# Patient Record
Sex: FEMALE | Race: OTHER | HISPANIC OR LATINO | Employment: FULL TIME | URBAN - METROPOLITAN AREA
[De-identification: names, ages, dates, MRNs, and addresses within clinical notes are randomized per-mention and may not be internally consistent; named-entity substitution may affect disease eponyms.]

---

## 2020-06-24 PROBLEM — O99.810 ABNORMAL O'SULLIVAN GLUCOSE CHALLENGE TEST, ANTEPARTUM: Status: ACTIVE | Noted: 2020-06-24

## 2020-06-24 PROBLEM — Z98.51 TUBAL LIGATION STATUS: Status: ACTIVE | Noted: 2020-06-24

## 2020-06-24 PROBLEM — E66.09 CLASS 1 OBESITY DUE TO EXCESS CALORIES WITHOUT SERIOUS COMORBIDITY IN ADULT: Status: ACTIVE | Noted: 2020-06-24

## 2020-06-24 PROBLEM — Z78.9 USES SPANISH AS PRIMARY SPOKEN LANGUAGE: Status: ACTIVE | Noted: 2020-06-24

## 2020-06-24 PROBLEM — Z98.891 HISTORY OF CESAREAN DELIVERY: Status: ACTIVE | Noted: 2020-06-24

## 2020-08-17 PROBLEM — A74.9 CHLAMYDIA INFECTION DURING PREGNANCY: Status: ACTIVE | Noted: 2020-08-17

## 2020-08-17 PROBLEM — O98.819 CHLAMYDIA INFECTION DURING PREGNANCY: Status: ACTIVE | Noted: 2020-08-17

## 2020-08-17 PROBLEM — Z3A.19 19 WEEKS GESTATION OF PREGNANCY: Status: ACTIVE | Noted: 2020-08-17

## 2020-08-17 PROBLEM — O99.212 OBESITY AFFECTING PREGNANCY IN SECOND TRIMESTER: Status: ACTIVE | Noted: 2020-06-24

## 2020-09-30 PROBLEM — O09.92 SUPERVISION OF HIGH-RISK PREGNANCY, SECOND TRIMESTER: Status: ACTIVE | Noted: 2020-09-30

## 2020-09-30 PROBLEM — G44.221 CHRONIC TENSION-TYPE HEADACHE, INTRACTABLE: Status: ACTIVE | Noted: 2020-09-30

## 2020-10-19 PROBLEM — Z3A.28 28 WEEKS GESTATION OF PREGNANCY: Status: ACTIVE | Noted: 2020-08-17

## 2020-10-19 PROBLEM — O09.93 SUPERVISION OF HIGH-RISK PREGNANCY, THIRD TRIMESTER: Status: ACTIVE | Noted: 2020-09-30

## 2020-10-19 PROBLEM — O26.843 UTERINE SIZE-DATE DISCREPANCY IN THIRD TRIMESTER: Status: ACTIVE | Noted: 2020-10-19

## 2020-11-08 PROBLEM — Z3A.30 30 WEEKS GESTATION OF PREGNANCY: Status: ACTIVE | Noted: 2020-08-17

## 2020-11-23 PROBLEM — Z3A.33 33 WEEKS GESTATION OF PREGNANCY: Status: ACTIVE | Noted: 2020-08-17

## 2020-12-07 PROBLEM — Z98.51 TUBAL LIGATION STATUS: Status: RESOLVED | Noted: 2020-06-24 | Resolved: 2020-12-07

## 2020-12-07 PROBLEM — Z3A.35 35 WEEKS GESTATION OF PREGNANCY: Status: ACTIVE | Noted: 2020-08-17

## 2020-12-30 PROBLEM — O98.513 COVID-19 AFFECTING PREGNANCY IN THIRD TRIMESTER: Status: ACTIVE | Noted: 2020-12-30

## 2020-12-30 PROBLEM — U07.1 COVID-19 AFFECTING PREGNANCY IN THIRD TRIMESTER: Status: ACTIVE | Noted: 2020-12-30

## 2021-01-06 PROBLEM — O28.8 EQUIVOCAL NON-STRESS TEST: Status: ACTIVE | Noted: 2021-01-06

## 2021-01-18 PROBLEM — O26.843 UTERINE SIZE-DATE DISCREPANCY IN THIRD TRIMESTER: Status: RESOLVED | Noted: 2020-10-19 | Resolved: 2021-01-18

## 2021-03-08 PROBLEM — O99.810 ABNORMAL O'SULLIVAN GLUCOSE CHALLENGE TEST, ANTEPARTUM: Status: RESOLVED | Noted: 2020-06-24 | Resolved: 2021-03-08

## 2021-03-08 PROBLEM — Z3A.35 35 WEEKS GESTATION OF PREGNANCY: Status: RESOLVED | Noted: 2020-08-17 | Resolved: 2021-03-08

## 2021-03-08 PROBLEM — O28.8 EQUIVOCAL NON-STRESS TEST: Status: RESOLVED | Noted: 2021-01-06 | Resolved: 2021-03-08

## 2021-03-08 PROBLEM — O09.93 SUPERVISION OF HIGH-RISK PREGNANCY, THIRD TRIMESTER: Status: RESOLVED | Noted: 2020-09-30 | Resolved: 2021-03-08

## 2021-03-08 PROBLEM — A74.9 CHLAMYDIA INFECTION DURING PREGNANCY: Status: RESOLVED | Noted: 2020-08-17 | Resolved: 2021-03-08

## 2021-03-08 PROBLEM — O99.212 OBESITY AFFECTING PREGNANCY IN SECOND TRIMESTER: Status: RESOLVED | Noted: 2020-06-24 | Resolved: 2021-03-08

## 2021-03-08 PROBLEM — O98.819 CHLAMYDIA INFECTION DURING PREGNANCY: Status: RESOLVED | Noted: 2020-08-17 | Resolved: 2021-03-08

## 2021-03-22 PROBLEM — J02.9 SORE THROAT: Status: ACTIVE | Noted: 2021-03-22

## 2021-03-22 PROBLEM — K59.00 CONSTIPATION: Status: ACTIVE | Noted: 2021-03-22

## 2021-03-22 PROBLEM — K92.1 BLOOD IN THE STOOL: Status: ACTIVE | Noted: 2021-03-22

## 2021-09-30 PROBLEM — H35.413 LATTICE DEGENERATION OF BOTH RETINAS: Status: ACTIVE | Noted: 2021-09-30

## 2021-09-30 PROBLEM — H53.001 AMBLYOPIA, RIGHT: Status: ACTIVE | Noted: 2021-09-30

## 2021-09-30 PROBLEM — H52.203 MYOPIA OF BOTH EYES WITH ASTIGMATISM: Status: ACTIVE | Noted: 2021-09-30

## 2021-09-30 PROBLEM — H52.13 MYOPIA OF BOTH EYES WITH ASTIGMATISM: Status: ACTIVE | Noted: 2021-09-30

## 2024-05-14 PROBLEM — Z30.017 NEXPLANON INSERTION: Status: ACTIVE | Noted: 2024-05-14

## 2024-06-12 ENCOUNTER — SOCIAL WORK (OUTPATIENT)
Dept: ADMINISTRATIVE | Facility: OTHER | Age: 29
End: 2024-06-12

## 2024-06-12 NOTE — PROGRESS NOTES
SW received consult for assistance with insurance. SW placed a call to [employer] to obtain . SW and  placed a phone call to pt to provide contact information for financial  counselor to further assist with applying for Free[employer] and/or Medicaid. SW and  answered all questions and provided guidance on initiating process. No other needs were mentioned.     Financial Assistance   [phone] ([facility] location)   [phone] ([facility])     YONI Rascon    [phone] (phone)  [phone] (fax)